# Patient Record
Sex: MALE | Race: WHITE | NOT HISPANIC OR LATINO
[De-identification: names, ages, dates, MRNs, and addresses within clinical notes are randomized per-mention and may not be internally consistent; named-entity substitution may affect disease eponyms.]

---

## 2023-04-21 PROBLEM — Z00.00 ENCOUNTER FOR PREVENTIVE HEALTH EXAMINATION: Status: ACTIVE | Noted: 2023-04-21

## 2023-04-25 ENCOUNTER — NON-APPOINTMENT (OUTPATIENT)
Age: 43
End: 2023-04-25

## 2023-04-25 ENCOUNTER — APPOINTMENT (OUTPATIENT)
Dept: SURGERY | Facility: CLINIC | Age: 43
End: 2023-04-25
Payer: COMMERCIAL

## 2023-04-25 VITALS
DIASTOLIC BLOOD PRESSURE: 70 MMHG | HEART RATE: 74 BPM | HEIGHT: 72 IN | SYSTOLIC BLOOD PRESSURE: 109 MMHG | WEIGHT: 175 LBS | BODY MASS INDEX: 23.7 KG/M2 | OXYGEN SATURATION: 97 % | TEMPERATURE: 98.1 F

## 2023-04-25 DIAGNOSIS — Z78.9 OTHER SPECIFIED HEALTH STATUS: ICD-10-CM

## 2023-04-25 DIAGNOSIS — M06.9 RHEUMATOID ARTHRITIS, UNSPECIFIED: ICD-10-CM

## 2023-04-25 DIAGNOSIS — F41.9 ANXIETY DISORDER, UNSPECIFIED: ICD-10-CM

## 2023-04-25 DIAGNOSIS — G43.909 MIGRAINE, UNSPECIFIED, NOT INTRACTABLE, W/OUT STATUS MIGRAINOSUS: ICD-10-CM

## 2023-04-25 DIAGNOSIS — Z77.22 CONTACT WITH AND (SUSPECTED) EXPOSURE TO ENVIRONMENTAL TOBACCO SMOKE (ACUTE) (CHRONIC): ICD-10-CM

## 2023-04-25 DIAGNOSIS — Z80.3 FAMILY HISTORY OF MALIGNANT NEOPLASM OF BREAST: ICD-10-CM

## 2023-04-25 PROCEDURE — 99203 OFFICE O/P NEW LOW 30 MIN: CPT

## 2023-04-25 RX ORDER — ESZOPICLONE 2 MG/1
2 TABLET, COATED ORAL
Refills: 0 | Status: ACTIVE | COMMUNITY

## 2023-04-25 RX ORDER — UPADACITINIB 15 MG/1
15 TABLET, EXTENDED RELEASE ORAL
Refills: 0 | Status: ACTIVE | COMMUNITY

## 2023-04-25 RX ORDER — ESCITALOPRAM OXALATE 10 MG/1
10 TABLET, FILM COATED ORAL
Refills: 0 | Status: ACTIVE | COMMUNITY

## 2023-04-25 RX ORDER — BUTALBITAL, ACETAMINOPHEN, AND CAFFEINE 50; 300; 40 MG/1; MG/1; MG/1
CAPSULE ORAL
Refills: 0 | Status: ACTIVE | COMMUNITY

## 2023-04-25 RX ORDER — SUMATRIPTAN SUCCINATE 50 MG/1
50 TABLET, FILM COATED ORAL
Refills: 0 | Status: ACTIVE | COMMUNITY

## 2023-04-25 RX ORDER — METHYLPREDNISOLONE 8 MG/1
TABLET ORAL
Refills: 0 | Status: ACTIVE | COMMUNITY

## 2023-04-25 NOTE — ASSESSMENT
[FreeTextEntry1] : Referring physician: Dr. Steven Benson\par \par Date: 4/25/2023\par \par Reason for referral right inguinal hernia\par \par This is a 43-year-old gentleman with at least a 1-1/2-year history of having a right inguinal hernia.  He did not have this addressed sooner given the fact that he recently had 2 young kids.  Patient now states he is here to have his hernia repaired.  The lump causes him discomfort at times and he has to push it back into relieve some of the discomfort.  This event is not associated with nausea or vomiting.  The patient states as the day progresses he feels more more discomfort in the right groin region secondary to the hernia.\par \par Patient has no history of surgery.  He has no history of prostate cancer.  He does have a history of rheumatoid arthritis and is on medicine for such.\par \par Physical examination: Patient examined erect and supine position.  He is placed through multiple Valsalva maneuvers.  Patient is noted to have an obvious right inguinal hernia.  The hernia is partially reducible in the supine position.  He has also incidentally noted a left inguinal hernia as well this is small.  Scrotum and testicles within normal limits.\par \par Impression/plan bilateral inguinal hernias right greater than left, chronically incarcerated right inguinal hernia, right groin pain: This is a 43-year-old gentleman with known right inguinal hernia for many years.  It is gotten to the point where he has discomfort when he does lifting or Valsalva type maneuvers and he now wish to have this repaired.  Incidentally noted on examination today is a left inguinal hernia as well.\par \par Patient will be scheduled for bilateral laparoscopic inguinal hernia repair with mesh.  This can be done laparoscopically.  The indications alternatives and complication of procedure discussed.  Questions answered.  Written consent obtained in the office today.\par \par

## 2023-05-31 ENCOUNTER — RESULT REVIEW (OUTPATIENT)
Age: 43
End: 2023-05-31

## 2023-06-12 ENCOUNTER — RESULT REVIEW (OUTPATIENT)
Age: 43
End: 2023-06-12

## 2023-06-12 ENCOUNTER — TRANSCRIPTION ENCOUNTER (OUTPATIENT)
Age: 43
End: 2023-06-12

## 2023-06-12 ENCOUNTER — APPOINTMENT (OUTPATIENT)
Dept: SURGERY | Facility: HOSPITAL | Age: 43
End: 2023-06-12

## 2023-06-27 ENCOUNTER — APPOINTMENT (OUTPATIENT)
Dept: SURGERY | Facility: CLINIC | Age: 43
End: 2023-06-27
Payer: COMMERCIAL

## 2023-06-27 VITALS
HEART RATE: 73 BPM | OXYGEN SATURATION: 98 % | DIASTOLIC BLOOD PRESSURE: 78 MMHG | SYSTOLIC BLOOD PRESSURE: 126 MMHG | TEMPERATURE: 98.4 F

## 2023-06-27 DIAGNOSIS — K40.30 UNILATERAL INGUINAL HERNIA, WITH OBSTRUCTION, W/OUT GANGRENE, NOT SPECIFIED AS RECURRENT: ICD-10-CM

## 2023-06-27 DIAGNOSIS — K40.90 UNILATERAL INGUINAL HERNIA, W/OUT OBSTRUCTION OR GANGRENE, NOT SPECIFIED AS RECURRENT: ICD-10-CM

## 2023-06-27 PROCEDURE — 99024 POSTOP FOLLOW-UP VISIT: CPT

## 2023-06-27 NOTE — ASSESSMENT
[FreeTextEntry1] : Patient presents today for follow-up after bilateral lap inguinal herniorrhaphy.  Patient without complaints at this time.  Patient happy with results.  No fever chills or sweats.  Appetite is normal as well as bowel movements.\par \par Physical examination the incisions healed beautifully there is no evidence of infection hematoma seroma or ecchymosis.  Scrotum and testicles within normal limits testicles nontender no recurrence bilaterally.\par \par Impression/plan status post bilateral lap inguinal herniorrhaphy patient doing well surgically no acute findings patient reassured and can resume all levels of activity patient to return on appearing basis

## 2024-09-09 ENCOUNTER — TRANSCRIPTION ENCOUNTER (OUTPATIENT)
Age: 44
End: 2024-09-09

## 2024-09-10 ENCOUNTER — TRANSCRIPTION ENCOUNTER (OUTPATIENT)
Age: 44
End: 2024-09-10